# Patient Record
Sex: FEMALE | Employment: FULL TIME | URBAN - METROPOLITAN AREA
[De-identification: names, ages, dates, MRNs, and addresses within clinical notes are randomized per-mention and may not be internally consistent; named-entity substitution may affect disease eponyms.]

---

## 2022-05-23 ENCOUNTER — HOSPITAL ENCOUNTER (OUTPATIENT)
Dept: PHYSICAL THERAPY | Age: 62
Discharge: HOME | End: 2022-05-23
Payer: COMMERCIAL

## 2022-05-23 PROCEDURE — 97162 PT EVAL MOD COMPLEX 30 MIN: CPT

## 2022-05-23 NOTE — PROGRESS NOTES
[address] [place] [address] PHYSICAL THERAPY AT [facility]. [place], [address] - Phone: [phone]  Fax: [phone] / [address]  Patient Name: Raquel Castaneda : 1960   Medical   Diagnosis: Right shoulder pain [M25.511] Treatment Diagnosis: R shoulder adhesive capsulitis   Onset Date: 2022     Referral Source: John Beaver MD Start of [facility]): 2022   Prior Hospitalization: See medical history Provider #: [id number]   Prior Level of Function: Full use of R UE for ADLs   Comorbidities: Fibromyalgia, depression, CA, prior L shoulder adhesive capsulitis   Medications: Verified on Patient Summary List   The Plan of Care and following information is based on the information from the initial evaluation.   ================================================================  Assessment / key information: Raquel Castaneda is a 62 y.o.  yo female with Dx of Right shoulder pain [M25.511]. She reports insidious onset of R shoulder pain and tightness beginning in 2022. She has received 1 cortisone injection without benefit. Observation: R UE held in guarded position  AROM measures: R shoulder flex= 88, scap= 57 ER= 30, IR= 60 degrees (PROM similiarly limitied)  MMT measures: WNL (within available range)  Special tests: + Neer's, Remonia Barrett  FOTO score= 40%.  ================================================================  Eval Complexity: History MEDIUM  Complexity : 1-2 comorbidities / personal factors will impact the outcome/ POC ;  Examination  MEDIUM Complexity : 3 Standardized tests and measures addressing body structure, function, activity limitation and / or participation in recreation ; Presentation MEDIUM Complexity : Evolving with changing characteristics ;   Decision Making MEDIUM Complexity : FOTO score of 26-74; Overall Complexity MEDIUM  Problem List: pain affecting function, decrease ROM, decrease ADL/ functional abilitiies, decrease activity tolerance and decrease flexibility/ joint mobility   Treatment Plan may include any combination of the following: Therapeutic exercise, Therapeutic activities, Neuromuscular re-education, Physical agent/modality, Manual therapy and Patient education  Patient / Family readiness to learn indicated by: asking questions, trying to perform skills and interest  Persons(s) to be included in education: patient (P)  Barriers to Learning/Limitations: None  Measures taken, if barriers to learning:    Patient Goal (s): Total ROM; no pain   Patient self reported health status: good  Rehabilitation Potential: good     Short Term Goals: To be accomplished in  2  weeks:  Patient will report >= 25% improvement in symptoms with ADLs  Patient will be educated in appropriate ROM, stabilization, strengthening exercises.  Long Term Goals: To be accomplished in  6-12  weeks:  1 Patient to report >= 75% improvement in symptoms with ADLs. 2 Patient will be independent with finalized HEP/ self maintenance. 3 Increase FOTO score >= 61% to indicate improved function with use of R UE.  4 Restore full AROM for improved ADL participation. Frequency / Duration:   Patient to be seen  2  times per week for 6 weeks:  Patient / Caregiver education and instruction: self care, activity modification and exercises    Therapist Signature: Mukesh Morin PT Date: 7/74/9632   Certification Period:  Time: 11:55 AM   ===================================================================  I certify that the above Physical Therapy Services are being furnished while the patient is under my care. I agree with the treatment plan and certify that this therapy is necessary. Physician Signature:        Date:       Time:        Bal Ray MD  Please sign and return to [facility] or you may fax the signed copy to [phone]. Thank you.

## 2022-05-23 NOTE — PROGRESS NOTES
PHYSICAL THERAPY - DAILY TREATMENT NOTE     Patient Name: Janent Powell        Date: 2022  : 1960   YES Patient  Verified  Visit #:     Insurance: Payor: Joaquin Block / Plan: [address] [place] H[employer] / Product Type: HMO /      In time: 1100 Out time: 1150   Total Treatment Time: 50     Medicare/BCBS Time Tracking (below)   Total Timed Codes (min):   1:1 Treatment Time:       TREATMENT AREA =  Right shoulder pain [M25.511]    SUBJECTIVE    Pain Level (on 0 to 10 scale):  3  / 10   Medication Changes/New allergies or changes in medical history, any new surgeries or procedures?     NO    If yes, update Summary List   Subjective Functional Status/Changes:  []  No changes reported     See eval /POC         OBJECTIVE  Modalities Rationale:     decrease pain to improve patient's ability to return to PLOF      min [] Estim, type/location:                                      []  att     []  unatt     []  w/US     []  w/ice    []  w/heat    min []  Mechanical Traction: type/lbs                   []  pro   []  sup   []  int   []  cont    []  before manual    []  after manual    min []  Ultrasound, settings/location:      min []  Iontophoresis w/ dexamethasone, location:                                               []  take home patch       []  in clinic   10 min []  Ice     [x]  Heat    location/position:     min []  Vasopneumatic Device, press/temp:     min []  Other:    [x] Skin assessment post-treatment (if applicable):    [x]  intact    [x]  redness- no adverse reaction     []redness - adverse reaction:      15 min Therapeutic Exercise:  [x]  See flow sheet   Rationale:      increase ROM and increase strength to improve the patients ability to return to PLOF     5 min Manual Therapy: Technique:      [x] S/DTM []IASTM []PROM [x] Passive Stretching   [x]manual TPR    []Jt manipulation:Gr I [] II []  III [] IV[]  []REIL with manual OP  Treatment Area:  R shoulder/ scap   Rationale:      decrease pain, increase ROM, increase tissue extensibility and decrease trigger points to improve patient's ability to return to PLOF     min Neuromuscular Re-ed: [x]  See flow sheet   Rationale:      improve coordination, improve balance, increase proprioception and dec dizziness to improve the patients ability to return to PLOF        min Self Care:    Rationale:    increase ROM, increase strength and improve coordination to improve the patients ability to return to PLOF    Billed With/As:   [] TE   [] TA   [] Neuro   [] Self Care Patient Education: [x] Review HEP    [] Progressed/Changed HEP based on:   [] positioning   [] body mechanics   [] transfers   [] heat/ice application    [] other:        Other Objective/Functional Measures:    See eval/ POC     Post Treatment Pain Level (on 0 to 10) scale:   0  / 10     ASSESSMENT    X  See POC     PLAN    [x]  Upgrade activities as tolerated {YES) Continue plan of care   []  Discharge due to :    []  Other:      Therapist: Rachel Lopez PT    Date: 5/23/2022 Time: 11:54 AM     Future Appointments   Date Time Provider Jakub Pereira   6/1/2022 11:00 AM Cecilia Amaral, PT [facility]   6/3/2022  9:00 AM Katlyn Romero PTA [facility]

## 2022-06-01 ENCOUNTER — HOSPITAL ENCOUNTER (OUTPATIENT)
Dept: PHYSICAL THERAPY | Age: 62
Discharge: HOME | End: 2022-06-01
Payer: COMMERCIAL

## 2022-06-01 PROCEDURE — 97140 MANUAL THERAPY 1/> REGIONS: CPT

## 2022-06-01 PROCEDURE — 97110 THERAPEUTIC EXERCISES: CPT

## 2022-06-01 NOTE — PROGRESS NOTES
PHYSICAL THERAPY - DAILY TREATMENT NOTE     Patient Name: Kasia Covington        Date: 2022  : 1960   YES Patient  Verified  Visit #:      of   12  Insurance: Payor: Fariba Malloy / Plan: [employer] HMO / Product Type: HMO /      In time: 586 Out time:    Total Treatment Time: 45     Medicare/BCBS Time Tracking (below)   Total Timed Codes (min):   1:1 Treatment Time:       TREATMENT AREA =  Right shoulder pain [M25.511]    SUBJECTIVE    Pain Level (on 0 to 10 scale):  3  / 10   Medication Changes/New allergies or changes in medical history, any new surgeries or procedures? NO    If yes, update Summary List   Subjective Functional Status/Changes:  []  No changes reported     Ex's going well.          OBJECTIVE  Modalities Rationale:     decrease inflammation and decrease pain to improve patient's ability to perform ADLs with less pain      min [] Estim, type/location:                                      []  att     []  unatt     []  w/US     []  w/ice    []  w/heat    min []  Mechanical Traction: type/lbs                   []  pro   []  sup   []  int   []  cont    []  before manual    []  after manual    min []  Ultrasound, settings/location:      min []  Iontophoresis w/ dexamethasone, location:                                               []  take home patch       []  in clinic   5 min [x]  Ice     []  Heat    location/position:     min []  Vasopneumatic Device, press/temp:              cold / med If using vaso       pre-treatment girth :       post-treatment girth :       measured at (location) :     min []  Other:    [x] Skin assessment post-treatment (if applicable):    [x]  intact    [x]  redness- no adverse reaction     []redness - adverse reaction:      20 min Manual Therapy: Technique:      [x] S/DTM []IASTM []PROM [x] Passive Stretching   [x]manual TPR  [] SOR [] man traction  []Jt manipulation:Gr I [] II []  III [] IV[]   [] OP with REIL    [x] inf/ post glidesTreatment Area: *manual therapy interventions were performed at a separate and distinct time from   the therapeutic activities interventions. Rationale:      decrease pain, increase ROM, increase tissue extensibility and decrease trigger points to improve patient's ability to perform ADLs with less pain/ reach OH    20 min Therapeutic Exercise:  [x]  See flow sheet   Rationale:      increase ROM and increase strength to improve the patients ability to reach/ perform ADLs         Billed With/As:   [] TE   [] TA   [] Neuro   [] Self Care Patient Education: [x] Review HEP    [] Progressed/Changed HEP based on:   [] positioning   [] body mechanics   [] transfers   [] heat/ice application    [] other:        Other Objective/Functional Measures:    Progressed HEP- adding dowel ext and towel IR    AAROM approx 120   Post Treatment Pain Level (on 0 to 10) scale:   0  / 10     ASSESSMENT    Assessment/Changes in Function:     Progressing ROM     []  See Progress Note/Recertification   Patient will continue to benefit from skilled PT services to modify and progress therapeutic interventions, address functional mobility deficits, address ROM deficits, address strength deficits and analyze and address soft tissue restrictions to attain remaining goals.       Progress toward goals / Updated goals:    Good Progress to    [] STG    [x] LTG  2 as shown by pt report/ demo       []  See Progress Note/Recertification    PLAN    [x]  Upgrade activities as tolerated {YES) Continue plan of care   []  Discharge due to :    []  Other:      Therapist: Zee Mendieta PT    Date: 6/1/2022 Time: 11:03 AM     Future Appointments   Date Time Provider Jakub Pereira   6/8/2022 10:30 AM Magali Anderson PTA [facility]

## 2022-06-03 ENCOUNTER — APPOINTMENT (OUTPATIENT)
Dept: PHYSICAL THERAPY | Age: 62
End: 2022-06-03
Payer: COMMERCIAL

## 2022-06-08 ENCOUNTER — HOSPITAL ENCOUNTER (OUTPATIENT)
Dept: PHYSICAL THERAPY | Age: 62
Discharge: HOME | End: 2022-06-08
Payer: COMMERCIAL

## 2022-06-08 PROCEDURE — 97110 THERAPEUTIC EXERCISES: CPT

## 2022-06-08 PROCEDURE — 97140 MANUAL THERAPY 1/> REGIONS: CPT

## 2022-06-08 NOTE — PROGRESS NOTES
PHYSICAL THERAPY - DAILY TREATMENT NOTE     Patient Name: Kamaljit Amaro        Date: 2022  : 1960   YES Patient  Verified  Visit #:   3   of   12  Insurance: Payor: Alfredo Davenport / Plan: [address] [place] [address] [place] [employer]MO / Product Type: HMO /      In time: 6856 Out time: 7536   Total Treatment Time: 45     Medicare/BCBS Time Tracking (below)   Total Timed Codes (min):   1:1 Treatment Time:       TREATMENT AREA =  Right shoulder pain [M25.511]    SUBJECTIVE    Pain Level (on 0 to 10 scale):  1  / 10   Medication Changes/New allergies or changes in medical history, any new surgeries or procedures? NO    If yes, update Summary List   Subjective Functional Status/Changes:  []  No changes reported     Doing better. Injections on Monday.          OBJECTIVE  Modalities Rationale:     decrease inflammation and decrease pain to improve patient's ability to perform ADL      min [] Estim, type/location:                                      []  att     []  unatt     []  w/US     []  w/ice    []  w/heat    min []  Mechanical Traction: type/lbs                   []  pro   []  sup   []  int   []  cont    []  before manual    []  after manual    min []  Ultrasound, settings/location:      min []  Iontophoresis w/ dexamethasone, location:                                               []  take home patch       []  in clinic   10 min [x]  Ice     []  Heat    location/position:     min []  Vasopneumatic Device, press/temp:              cold / med If using vaso       pre-treatment girth :       post-treatment girth :       measured at (location) :     min []  Other:    [x] Skin assessment post-treatment (if applicable):    [x]  intact    []  redness- no adverse reaction     []redness - adverse reaction:      15 min Manual Therapy: Technique:      [x] S/DTM []IASTM []PROM [x] Passive Stretching   [x]manual TPR  [] SOR [] man traction  []Jt manipulation:Gr I [] II []  III [] IV[]   [] OP with REIL    [x] post glides, inf glides, oscillations    Treatment Area: R shoulder       *manual therapy interventions were performed at a separate and distinct time from   the therapeutic activities interventions. Rationale:      decrease pain, increase ROM, increase tissue extensibility and decrease trigger points to improve patient's ability to reach/ perform ADLs    20 min Therapeutic Exercise:  [x]  See flow sheet   Rationale:      increase ROM to improve the patients ability to reach/ perform ADL         Billed With/As:   [] TE   [] TA   [] Neuro   [] Self Care Patient Education: [x] Review HEP    [] Progressed/Changed HEP based on:   [] positioning   [] body mechanics   [] transfers   [] heat/ice application    [] other:        Other Objective/Functional Measures:    AAROM flex= 125, scap= 120, IR/EXT to sacrum   Post Treatment Pain Level (on 0 to 10) scale:  0-1  / 10     ASSESSMENT    Assessment/Changes in Function:      improving ability to reach with inc ROM, dec pain     []  See Progress Note/Recertification   Patient will continue to benefit from skilled PT services to modify and progress therapeutic interventions, address functional mobility deficits, address ROM deficits and analyze and address soft tissue restrictions to attain remaining goals. Progress toward goals / Updated goals:    Good Progress to    [] STG    [x] LTG  4 as shown by pt demo  Good progress to LTG 1/2- per pt report       []  See Progress Note/Recertification    PLAN    [x]  Upgrade activities as tolerated {YES) Continue plan of care   []  Discharge due to :    []  Other:      Therapist: Berkley Reyes, PT    Date: 6/8/2022 Time: 10:22 AM   No future appointments.

## 2022-06-15 ENCOUNTER — HOSPITAL ENCOUNTER (OUTPATIENT)
Dept: PHYSICAL THERAPY | Age: 62
Discharge: HOME | End: 2022-06-15
Payer: COMMERCIAL

## 2022-06-15 PROCEDURE — 97110 THERAPEUTIC EXERCISES: CPT

## 2022-06-15 PROCEDURE — 97140 MANUAL THERAPY 1/> REGIONS: CPT

## 2022-06-22 ENCOUNTER — HOSPITAL ENCOUNTER (OUTPATIENT)
Dept: PHYSICAL THERAPY | Age: 62
Discharge: HOME | End: 2022-06-22
Payer: COMMERCIAL

## 2022-06-22 PROCEDURE — 97140 MANUAL THERAPY 1/> REGIONS: CPT

## 2022-06-22 PROCEDURE — 97110 THERAPEUTIC EXERCISES: CPT

## 2022-06-22 NOTE — PROGRESS NOTES
PHYSICAL THERAPY - DAILY TREATMENT NOTE     Patient Name: Keya Arroyo        Date: 2022  : 1960   YES Patient  Verified  Visit #:      of   12  Insurance: Payor: Kendall Began / Plan: [employer] HMO / Product Type: HMO /      In time: 4949 Out time: 4304   Total Treatment Time: 45     Medicare/BCBS Time Tracking (below)   Total Timed Codes (min):   1:1 Treatment Time:       TREATMENT AREA =  Right shoulder pain [M25.511]    SUBJECTIVE    Pain Level (on 0 to 10 scale):  3 at worst  / 10   Medication Changes/New allergies or changes in medical history, any new surgeries or procedures?     NO    If yes, update Summary List   Subjective Functional Status/Changes:  []  No changes reported     ROM getting better         OBJECTIVE  Modalities Rationale:     decrease inflammation and decrease pain to improve patient's ability to reach      min [] Estim, type/location:                                      []  att     []  unatt     []  w/US     []  w/ice    []  w/heat    min []  Mechanical Traction: type/lbs                   []  pro   []  sup   []  int   []  cont    []  before manual    []  after manual    min []  Ultrasound, settings/location:      min []  Iontophoresis w/ dexamethasone, location:                                               []  take home patch       []  in clinic   10 min [x]  Ice     []  Heat    location/position:     min []  Vasopneumatic Device, press/temp:              cold / med If using vaso       pre-treatment girth :       post-treatment girth :       measured at (location) :     min []  Other:    [x] Skin assessment post-treatment (if applicable):    [x]  intact    []  redness- no adverse reaction     []redness - adverse reaction:      15 min Manual Therapy: Technique:      [x] S/DTM []IASTM [x]PROM [x] Passive Stretching   [x]manual TPR  [] SOR [] man traction  []Jt manipulation:Gr I [] II []  III [] IV[]   [] OP with REIL    [x] joint distraction, glides- inf/ post  Treatment Area: *manual therapy interventions were performed at a separate and distinct time from   the therapeutic activities interventions. Rationale:      decrease pain, increase ROM, increase tissue extensibility and decrease trigger points to improve patient's ability to reach    20 min Therapeutic Exercise:  [x]  See flow sheet   Rationale:      increase ROM to improve the patients ability to reach       Billed With/As:   [x] TE   [] TA   [] Neuro   [] Self Care Patient Education: [x] Review HEP    [] Progressed/Changed HEP based on:   [] positioning   [] body mechanics   [] transfers   [] heat/ice application    [] other:        Other Objective/Functional Measures:    AROM flex= 138, scap= 122   Post Treatment Pain Level (on 0 to 10) scale:  0-1 / 10     ASSESSMENT    Assessment/Changes in Function:    improved reaching ability        []  See Progress Note/Recertification   Patient will continue to benefit from skilled PT services to modify and progress therapeutic interventions, address functional mobility deficits, address ROM deficits, analyze and address soft tissue restrictions and analyze and cue movement patterns to attain remaining goals.       Progress toward goals / Updated goals:    Good Progress to    [] STG    [x] LTG  1 , 2, 4 as shown by pt report of min pain, demo of inc ROM       []  See Progress Note/Recertification    PLAN    [x]  Upgrade activities as tolerated {YES) Continue plan of care   []  Discharge due to :    []  Other:      Therapist: Jd Shaver PT    Date: 6/22/2022 Time: 10:30 AM     Future Appointments   Date Time Provider Jakub Pereira   6/28/2022 12:00 PM Rod Venegas PT [facility]   7/6/2022  9:30 AM Rod Venegas PT [facility]   7/11/2022 11:00 AM Rod Venegas PT [facility]   7/20/2022 10:15 AM Rod Venegas PT [facility]   7/27/2022 10:15 AM Rod Venegas PT [facility]   8/3/2022 10:15 AM Rod Venegas PT [facility]   8/10/2022 10:15 AM Reilly Lalita Heranndez [facility]   8/17/2022 10:15 AM Lester Ledezma, PT [facility]   8/24/2022 10:15 AM Lester Ledezma, PT [facility]   8/31/2022 10:15 AM Lester Ledezma, PT [facility]

## 2022-06-30 ENCOUNTER — APPOINTMENT (OUTPATIENT)
Dept: PHYSICAL THERAPY | Age: 62
End: 2022-06-30
Payer: COMMERCIAL

## 2022-07-06 ENCOUNTER — HOSPITAL ENCOUNTER (OUTPATIENT)
Dept: PHYSICAL THERAPY | Age: 62
Discharge: HOME | End: 2022-07-06
Payer: COMMERCIAL

## 2022-07-06 PROCEDURE — 97110 THERAPEUTIC EXERCISES: CPT

## 2022-07-06 PROCEDURE — 97140 MANUAL THERAPY 1/> REGIONS: CPT

## 2022-07-06 NOTE — PROGRESS NOTES
PHYSICAL THERAPY - DAILY TREATMENT NOTE     Patient Name: Reji Ardon        Date: 2022  : 1960   YES Patient  Verified  Visit #:      12  Insurance: Payor: Andrew Etienne / Plan: [employer] HMO / Product Type: HMO /      In time: 930 Out time: 167   Total Treatment Time: 45     Medicare/BCBS Time Tracking (below)   Total Timed Codes (min):   1:1 Treatment Time:       TREATMENT AREA =  Right shoulder pain [M25.511]    SUBJECTIVE    Pain Level (on 0 to 10 scale):  1  / 10   Medication Changes/New allergies or changes in medical history, any new surgeries or procedures?     NO    If yes, update Summary List   Subjective Functional Status/Changes:  []  No changes reported     Working on ex's hard at home    Overall 90% improved         OBJECTIVE  Modalities Rationale:     decrease inflammation and decrease pain to improve patient's ability to perform ADLs      min [] Estim, type/location:                                      []  att     []  unatt     []  w/US     []  w/ice    []  w/heat    min []  Mechanical Traction: type/lbs                   []  pro   []  sup   []  int   []  cont    []  before manual    []  after manual    min []  Ultrasound, settings/location:      min []  Iontophoresis w/ dexamethasone, location:                                               []  take home patch       []  in clinic   10 min [x]  Ice     []  Heat    location/position:     min []  Vasopneumatic Device, press/temp:              cold / med If using vaso       pre-treatment girth :       post-treatment girth :       measured at (location) :     min []  Other:    [x] Skin assessment post-treatment (if applicable):    [x]  intact    [x]  redness- no adverse reaction     []redness - adverse reaction:      15* min Manual Therapy: Technique:      [x] S/DTM []IASTM []PROM [x] Passive Stretching   [x]manual TPR  [] SOR [] man traction  []Jt manipulation:Gr I [] II []  III [] IV[]   [] OP with REIL    []   Treatment Area:  R scap/ shoulder region    *manual therapy interventions were performed at a separate and distinct time from   the therapeutic activities interventions.    Rationale:      decrease pain, increase ROM, increase tissue extensibility and decrease trigger points to improve patient's ability to perform ADLs    20 min Therapeutic Exercise:  [x]  See flow sheet   Rationale:      increase ROM to improve the patients ability to perform ADLs         Billed With/As:   [x] TE   [] TA   [] Neuro   [] Self Care Patient Education: [x] Review HEP    [] Progressed/Changed HEP based on:   [] positioning   [] body mechanics   [] transfers   [] heat/ice application    [] other:        Other Objective/Functional Measures:    AROM r shoulder: flex= 146, scap= 137, ER= 55, IR= 82   Post Treatment Pain Level (on 0 to 10) scale:   0  / 10     ASSESSMENT    [x]  See Progress Note/Recertification    PLAN    []  Upgrade activities as tolerated {YES) Continue plan of care   []  Discharge due to :    [x]  Other: Hold PT; pt to continue with HEP     Therapist: Pola Johnson PT    Date: 7/6/2022 Time: 9:34 AM     Future Appointments   Date Time Provider Jakub Pereira   7/11/2022 11:00 AM Cyrilla Merle, PT [facility]   7/20/2022 10:15 AM Cyrilla Merle, PT [facility]   7/27/2022 10:15 AM Cyrilla Merle, PT [facility]   8/3/2022 10:15 AM Cyrilla Merle, PT [facility]   8/10/2022 10:15 AM Cyrilla Merle, PT [facility]   8/17/2022 10:15 AM Cyrilla Merle, PT [facility]   8/24/2022 10:15 AM Cyrilla Merle, PT [facility]   8/31/2022 10:15 AM Cyrilla Merle, PT [facility]

## 2022-07-11 ENCOUNTER — HOSPITAL ENCOUNTER (OUTPATIENT)
Dept: PHYSICAL THERAPY | Age: 62
Discharge: HOME | End: 2022-07-11
Payer: COMMERCIAL

## 2022-07-11 PROCEDURE — 97140 MANUAL THERAPY 1/> REGIONS: CPT

## 2022-07-11 PROCEDURE — 97110 THERAPEUTIC EXERCISES: CPT

## 2022-07-11 NOTE — PROGRESS NOTES
PHYSICAL THERAPY - DAILY TREATMENT NOTE     Patient Name: Flora Ahumada        Date: 2022  : 1960   YES Patient  Verified  Visit #:     Insurance: Payor: Toby Human / Plan: [employer] HMO / Product Type: HMO /      In time: 7266 Out time: 113   Total Treatment Time: 35     Medicare/BCBS Time Tracking (below)   Total Timed Codes (min):   1:1 Treatment Time:       TREATMENT AREA =  Right shoulder pain [M25.511]    SUBJECTIVE    Pain Level (on 0 to 10 scale):  1  / 10   Medication Changes/New allergies or changes in medical history, any new surgeries or procedures? NO    If yes, update Summary List   Subjective Functional Status/Changes:  []  No changes reported     Saw MD--said doing great         OBJECTIVE      15 min Manual Therapy: Technique:      [x] S/DTM []IASTM []PROM [] Passive Stretching   [x]manual TPR  [] SOR [] man traction  []Jt manipulation:Gr I [] II []  III [] IV[]   [] OP with REIL    [x] joint glides  Treatment Area:  R [facility] region      *manual therapy interventions were performed at a separate and distinct time from   the therapeutic activities interventions.    Rationale:      decrease pain, increase ROM, increase tissue extensibility and decrease trigger points to improve patient's ability to reach    20 min Therapeutic Exercise:  [x]  See flow sheet   Rationale:      increase ROM to improve the patients ability to reach       Billed With/As:   [] TE   [] TA   [] Neuro   [] Self Care Patient Education: [x] Review HEP    [] Progressed/Changed HEP based on:   [] positioning   [] body mechanics   [] transfers   [] heat/ice application    [] other:        Other Objective/Functional Measures:    Restricted [facility] ant glide   Post Treatment Pain Level (on 0 to 10) scale:   0  / 10     ASSESSMENT    Assessment/Changes in Function:     Continual progress with improved ROM, reaching behind back     []  See Progress Note/Recertification   Patient will continue to benefit from skilled PT services to modify and progress therapeutic interventions, address functional mobility deficits, address ROM deficits, address strength deficits, analyze and address soft tissue restrictions and analyze and cue movement patterns to attain remaining goals.       Progress toward goals / Updated goals:    Good Progress to    [] STG    [x] LTG  4 as shown by PARRIS zuñiga       []  See Progress Note/Recertification    PLAN    [x]  Upgrade activities as tolerated {YES) Continue plan of care   []  Discharge due to :    []  Other:      Therapist: Jan Vera PT    Date: 7/11/2022 Time: 11:06 AM     Future Appointments   Date Time Provider Jakub Pereira   7/20/2022 10:15 AM Greg Loyd, PT [facility]   7/27/2022 10:15 AM Greg Loyd, PT [facility]   8/3/2022 10:15 AM Greg Loyd, PT [facility]   8/10/2022 10:15 AM Greg Loyd, PT [facility]   8/17/2022 10:15 AM Greg Loyd, PT [facility]   8/24/2022 10:15 AM Greg Loyd, PT [facility]   8/31/2022 10:15 AM Greg Loyd, PT [facility]

## 2022-07-20 ENCOUNTER — HOSPITAL ENCOUNTER (OUTPATIENT)
Dept: PHYSICAL THERAPY | Age: 62
Discharge: HOME | End: 2022-07-20
Payer: COMMERCIAL

## 2022-07-20 PROCEDURE — 97110 THERAPEUTIC EXERCISES: CPT

## 2022-07-20 PROCEDURE — 97140 MANUAL THERAPY 1/> REGIONS: CPT

## 2022-07-20 NOTE — PROGRESS NOTES
PHYSICAL THERAPY - DAILY TREATMENT NOTE     Patient Name: Ana Hayden        Date: 2022  : 1960   YES Patient  Verified  Visit #:   8      12  Insurance: Payor: Markell Meyer / Plan: Vladimir Alfred HMO / Product Type: HMO /      In time: 8159 Out time: 1490   Total Treatment Time: 45     Medicare/BCBS Time Tracking (below)   Total Timed Codes (min):   1:1 Treatment Time:       TREATMENT AREA =  Right shoulder pain [M25.511]    SUBJECTIVE    Pain Level (on 0 to 10 scale):  0  / 10   Medication Changes/New allergies or changes in medical history, any new surgeries or procedures? NO    If yes, update Summary List   Subjective Functional Status/Changes:  []  No changes reported     Still improving.            OBJECTIVE  Modalities Rationale:     decrease inflammation and decrease pain to improve patient's ability to perform ADLs      min [] Estim, type/location:                                      []  att     []  unatt     []  w/US     []  w/ice    []  w/heat    min []  Mechanical Traction: type/lbs                   []  pro   []  sup   []  int   []  cont    []  before manual    []  after manual    min []  Ultrasound, settings/location:      min []  Iontophoresis w/ dexamethasone, location:                                               []  take home patch       []  in clinic   10 min []  Ice     []  Heat    location/position:     min []  Vasopneumatic Device, press/temp:              cold / med If using vaso       pre-treatment girth :       post-treatment girth :       measured at (location) :     min []  Other:    [x] Skin assessment post-treatment (if applicable):    [x]  intact    []  redness- no adverse reaction     []redness - adverse reaction:      15 min Manual Therapy: Technique:      [x] S/DTM []IASTM [x]PROM [x] Passive Stretching   [x]manual TPR  [] SOR [] man traction  []Jt manipulation:Gr I [] II []  III [] IV[]   [] OP with REIL    []   Treatment Area:  R scap/ G-H      *manual therapy interventions were performed at a separate and distinct time from   the therapeutic activities interventions. Rationale:      decrease pain, increase ROM, increase tissue extensibility, and decrease trigger points to improve patient's ability to return to PLOF    20 min Therapeutic Exercise:  [x]  See flow sheet   Rationale:      increase ROM to improve the patients ability to return to PLOF         Billed With/As:   [] TE   [] TA   [] Neuro   [] Self Care Patient Education: [x] Review HEP    [] Progressed/Changed HEP based on:   [] positioning   [] body mechanics   [] transfers   [] heat/ice application    [] other:        Other Objective/Functional Measures:    MRE to T8    AAROM flex= 154   Post Treatment Pain Level (on 0 to 10) scale:   0  / 10     ASSESSMENT    Assessment/Changes in Function:     Able to do bra strap     []  See Progress Note/Recertification   Patient will continue to benefit from skilled PT services to modify and progress therapeutic interventions, address functional mobility deficits, address ROM deficits, and analyze and address soft tissue restrictions to attain remaining goals.       Progress toward goals / Updated goals:    Cont progression of ROM       []  See Progress Note/Recertification    PLAN    [x]  Upgrade activities as tolerated {YES) Continue plan of care   []  Discharge due to :    []  Other:      Therapist: Jonas Duran, PT    Date: 7/20/2022 Time: 10:24 AM     Future Appointments   Date Time Provider Jakub Pereira   7/27/2022 10:15 AM De Floor, PT [facility]   8/3/2022 10:15 AM De Floor, PT [facility]   8/10/2022 10:15 AM De Floor, PT [facility]   8/17/2022 10:15 AM De Floor, PT [facility]   8/24/2022 10:15 AM De Floor, PT [facility]   8/31/2022 10:15 AM De Floor, PT [facility]

## 2022-07-27 ENCOUNTER — HOSPITAL ENCOUNTER (OUTPATIENT)
Dept: PHYSICAL THERAPY | Age: 62
Discharge: HOME | End: 2022-07-27
Payer: COMMERCIAL

## 2022-07-27 PROCEDURE — 97110 THERAPEUTIC EXERCISES: CPT

## 2022-07-27 PROCEDURE — 97140 MANUAL THERAPY 1/> REGIONS: CPT

## 2022-07-27 NOTE — PROGRESS NOTES
PHYSICAL THERAPY - DAILY TREATMENT NOTE     Patient Name: Brandie Dugan        Date: 2022  : 1960   YES Patient  Verified  Visit #:     Insurance: Payor: Fiona Lipoma / Plan: Humera Davis HMO / Product Type: HMO /      In time: 4573 Out time: 3523   Total Treatment Time: 45     Medicare/BCBS Time Tracking (below)   Total Timed Codes (min):   1:1 Treatment Time:       TREATMENT AREA =  Right shoulder pain [M25.511]    SUBJECTIVE    Pain Level (on 0 to 10 scale):  0  / 10   Medication Changes/New allergies or changes in medical history, any new surgeries or procedures?     NO    If yes, update Summary List   Subjective Functional Status/Changes:  []  No changes reported     Discomfort only when stretching         OBJECTIVE  Modalities Rationale:     decrease pain and increase tissue extensibility to improve patient's ability to perform ADLs      min [] Estim, type/location:                                      []  att     []  unatt     []  w/US     []  w/ice    []  w/heat    min []  Mechanical Traction: type/lbs                   []  pro   []  sup   []  int   []  cont    []  before manual    []  after manual    min []  Ultrasound, settings/location:      min []  Iontophoresis w/ dexamethasone, location:                                               []  take home patch       []  in clinic   10 min [x]  Ice     []  Heat    location/position:     min []  Vasopneumatic Device, press/temp:              cold / med If using vaso       pre-treatment girth :       post-treatment girth :       measured at (location) :     min []  Other:    [x] Skin assessment post-treatment (if applicable):    [x]  intact    []  redness- no adverse reaction     []redness - adverse reaction:      15 min Manual Therapy: Technique:      [x] S/DTM []IASTM [x]PROM [x] Passive Stretching   [x]manual TPR  [] SOR [] man traction  []Jt manipulation:Gr I [] II []  III [] IV[]   [] OP with REIL    []   Treatment Area:  R shoulder/ scap region      *manual therapy interventions were performed at a separate and distinct time from   the therapeutic activities interventions. Rationale:      decrease pain, increase ROM, increase tissue extensibility, and decrease trigger points to improve patient's ability to perform ADLs    20 min Therapeutic Exercise:  [x]  See flow sheet   Rationale:      increase ROM and increase strength to improve the patients ability to perform ADLs         Billed With/As:   [] TE   [] TA   [] Neuro   [] Self Care Patient Education: [x] Review HEP    [] Progressed/Changed HEP based on:   [] positioning   [] body mechanics   [] transfers   [] heat/ice application    [] other:        Other Objective/Functional Measures:    AROM R shoulder: flex= 155, scap= 153, ER= 80, IR= 72   Post Treatment Pain Level (on 0 to 10) scale:   0  / 10     ASSESSMENT      [x]  See Progress Note/Recertification    I have reviewed discharge instructions with the patient. The patient verbalized understanding.      PLAN    []  Upgrade activities as tolerated {YES) Continue plan of care   []  Discharge due to :    [x]  Other: hold     Therapist: Carolee Dick PT    Date: 7/27/2022 Time: 10:27 AM     Future Appointments   Date Time Provider Jakub Pereira   8/3/2022 10:15 AM Mckenna De La Fuente PT [facility]   8/10/2022 10:15 AM Mckenna De La Fuente PT [facility]   8/17/2022 10:15 AM Mckenna De La Fuente PT [facility]   8/24/2022 10:15 AM Mckenna De La Fuente PT [facility]   8/31/2022 10:15 AM Mckenna De La Fuente PT [facility]

## 2022-07-27 NOTE — PROGRESS NOTES
[address] [place] [address] PHYSICAL THERAPY AT [facility] [address] [place], [address]  Phone: [phone]  Fax: [phone]  PROGRESS NOTE            Patient Name: Antonio Tovar : 1960   Treatment/Medical Diagnosis: Right shoulder pain [M25.511]   Referral Source: Walt Velásquez MD       Date of Initial Visit: 22 Attended Visits: 9 Missed Visits: -      SUMMARY OF TREATMENT  Pt has been seen 1x/week for PT which has consisted of manual therapy, therapeutic exercise and modalities for pain management. Patient has been instructed in a HEP and reports/ demonstrates full compliance. CURRENT STATUS  Patient is progressing well through this course of PT. She reports decreased pain, improved mobility and function. ROM has significantly improved, but with end range capsular restrictions. She is motivated and compliant with HEP. Patient is requesting placing PT on hold, and continuing with self care. Previous Goals:  1 Patient to report >= 75% improvement in symptoms with ADLs. 2 Patient will be independent with finalized HEP/ self maintenance. 3 Increase FOTO score >= 61% to indicate improved function with use of R UE.  4 Restore full AROM for improved ADL participation. Prior Level/Current Level:  1) Prior Level: na              Current Level: 90%              Goal Met? yes  2) Prior Level: na              Current Level: indep and compliant with HEP              Goal Met? yes  3) Prior Level: 40%              Current Level: 70%              Goal Met? yes  4) Prior Level: AROM R shoulder: flex= 146, scap= 137, ER= 55, IR= 82              Current Level: AROM R shoulder: flex= 155, scap= 153, ER= 80, IR= 72              Goal Met? progressing     New Goals to be achieved in __4__  weeks:  Continue towards unmet goals as stated above     Assessments/Recommendations: Place PT on hold x 30 days. Patient to continue with HEP. She has been instructed to return to PT if symptoms worsen while on hold. If patient does not return within 30 days, she will be discharged from PT     If you have any questions/comments please contact us directly at [phone]. Thank you for allowing us to assist in the care of your patient. Therapist Signature: Diya Bartlett, PT Date: 7/2722   Reporting Period:  Certification Period:         Time: 11:00 AM   NOTE TO PHYSICIAN:  PLEASE COMPLETE THE ORDERS BELOW AND FAX TO  [facility] at [address]: [phone]. If you are unable to process this request in 24 hours please contact our office: [phone].     ___ I have read the above report and request that my patient continue as recommended.   ___ I have read the above report and request that my patient continue therapy with the following changes/special instructions:_________________________________________   ___ I have read the above report and request that my patient be discharged from therapy.       Physician Signature:                                                               Date:                                        Time:                                                            Florencia Grubbs MD

## 2022-08-03 ENCOUNTER — APPOINTMENT (OUTPATIENT)
Dept: PHYSICAL THERAPY | Age: 62
End: 2022-08-03

## 2022-08-10 ENCOUNTER — APPOINTMENT (OUTPATIENT)
Dept: PHYSICAL THERAPY | Age: 62
End: 2022-08-10

## 2022-08-17 ENCOUNTER — APPOINTMENT (OUTPATIENT)
Dept: PHYSICAL THERAPY | Age: 62
End: 2022-08-17

## 2022-08-24 ENCOUNTER — APPOINTMENT (OUTPATIENT)
Dept: PHYSICAL THERAPY | Age: 62
End: 2022-08-24

## 2022-08-31 ENCOUNTER — APPOINTMENT (OUTPATIENT)
Dept: PHYSICAL THERAPY | Age: 62
End: 2022-08-31

## 2022-09-07 NOTE — PROGRESS NOTES
[address] [place] [address] PHYSICAL THERAPY AT [facility] [address] [place], [address]  Phone: [phone]  Fax: [phone]  DISCHARGE SUMMARY FOR PHYSICAL THERAPY                           Patient Name: Marget Goldberg : 1960   Treatment/Medical Diagnosis: Right shoulder pain [M25.511]   Referral Source: Carlo Ko MD       Date of Initial Visit: 22 Attended Visits: 9 Missed Visits: -        Current Status:  Patient was last seen on 22 at which time they were placed on hold and instructed to continue with HEP and to return to PT within 30 days if symptoms progressed. It has now been >= 30 days and Marget Goldberg has not returned for further treatment. Assessments/Recommendations: Discharge from PT    If you have any questions/comments please contact us directly at [phone]. Thank you for allowing us to assist in the care of your patient.     Therapist Signature: Bernardino Samaniego PT Date: 22   Reporting Period:  Time: 9:54 AM